# Patient Record
Sex: FEMALE | Race: WHITE
[De-identification: names, ages, dates, MRNs, and addresses within clinical notes are randomized per-mention and may not be internally consistent; named-entity substitution may affect disease eponyms.]

---

## 2017-05-08 ENCOUNTER — HOSPITAL ENCOUNTER (EMERGENCY)
Dept: HOSPITAL 62 - ER | Age: 47
Discharge: HOME | End: 2017-05-08
Payer: COMMERCIAL

## 2017-05-08 VITALS — SYSTOLIC BLOOD PRESSURE: 111 MMHG | DIASTOLIC BLOOD PRESSURE: 71 MMHG

## 2017-05-08 DIAGNOSIS — E11.65: ICD-10-CM

## 2017-05-08 DIAGNOSIS — F41.9: ICD-10-CM

## 2017-05-08 DIAGNOSIS — R07.89: ICD-10-CM

## 2017-05-08 DIAGNOSIS — I10: Primary | ICD-10-CM

## 2017-05-08 DIAGNOSIS — R00.0: ICD-10-CM

## 2017-05-08 LAB
ALBUMIN SERPL-MCNC: 4.5 G/DL (ref 3.5–5)
ALP SERPL-CCNC: 122 U/L (ref 38–126)
ALT SERPL-CCNC: 25 U/L (ref 9–52)
ANION GAP SERPL CALC-SCNC: 12 MMOL/L (ref 5–19)
AST SERPL-CCNC: 21 U/L (ref 14–36)
BASOPHILS # BLD AUTO: 0.1 10^3/UL (ref 0–0.2)
BASOPHILS NFR BLD AUTO: 0.6 % (ref 0–2)
BILIRUB DIRECT SERPL-MCNC: 0.5 MG/DL (ref 0–0.4)
BILIRUB SERPL-MCNC: 0.7 MG/DL (ref 0.2–1.3)
BUN SERPL-MCNC: 13 MG/DL (ref 7–20)
CALCIUM: 10.6 MG/DL (ref 8.4–10.2)
CHLORIDE SERPL-SCNC: 101 MMOL/L (ref 98–107)
CK MB SERPL-MCNC: 0.29 NG/ML (ref ?–4.55)
CK SERPL-CCNC: 31 U/L (ref 30–135)
CO2 SERPL-SCNC: 24 MMOL/L (ref 22–30)
CREAT SERPL-MCNC: 0.5 MG/DL (ref 0.52–1.25)
EOSINOPHIL # BLD AUTO: 0 10^3/UL (ref 0–0.6)
EOSINOPHIL NFR BLD AUTO: 0.2 % (ref 0–6)
ERYTHROCYTE [DISTWIDTH] IN BLOOD BY AUTOMATED COUNT: 13 % (ref 11.5–14)
GLUCOSE SERPL-MCNC: 354 MG/DL (ref 75–110)
HCT VFR BLD CALC: 46.1 % (ref 36–47)
HGB BLD-MCNC: 16 G/DL (ref 12–15.5)
HGB HCT DIFFERENCE: 1.9
LYMPHOCYTES # BLD AUTO: 1.4 10^3/UL (ref 0.5–4.7)
LYMPHOCYTES NFR BLD AUTO: 14.1 % (ref 13–45)
MCH RBC QN AUTO: 31.7 PG (ref 27–33.4)
MCHC RBC AUTO-ENTMCNC: 34.6 G/DL (ref 32–36)
MCV RBC AUTO: 92 FL (ref 80–97)
MONOCYTES # BLD AUTO: 0.4 10^3/UL (ref 0.1–1.4)
MONOCYTES NFR BLD AUTO: 3.7 % (ref 3–13)
NEUTROPHILS # BLD AUTO: 8 10^3/UL (ref 1.7–8.2)
NEUTS SEG NFR BLD AUTO: 81.4 % (ref 42–78)
POTASSIUM SERPL-SCNC: 5 MMOL/L (ref 3.6–5)
PROT SERPL-MCNC: 7.3 G/DL (ref 6.3–8.2)
RBC # BLD AUTO: 5.03 10^6/UL (ref 3.72–5.28)
SODIUM SERPL-SCNC: 136.6 MMOL/L (ref 137–145)
TROPONIN I SERPL-MCNC: < 0.012 NG/ML
WBC # BLD AUTO: 9.8 10^3/UL (ref 4–10.5)

## 2017-05-08 PROCEDURE — 85379 FIBRIN DEGRADATION QUANT: CPT

## 2017-05-08 PROCEDURE — 71010: CPT

## 2017-05-08 PROCEDURE — 82553 CREATINE MB FRACTION: CPT

## 2017-05-08 PROCEDURE — 84443 ASSAY THYROID STIM HORMONE: CPT

## 2017-05-08 PROCEDURE — 82550 ASSAY OF CK (CPK): CPT

## 2017-05-08 PROCEDURE — 36415 COLL VENOUS BLD VENIPUNCTURE: CPT

## 2017-05-08 PROCEDURE — 99284 EMERGENCY DEPT VISIT MOD MDM: CPT

## 2017-05-08 PROCEDURE — 85025 COMPLETE CBC W/AUTO DIFF WBC: CPT

## 2017-05-08 PROCEDURE — 84484 ASSAY OF TROPONIN QUANT: CPT

## 2017-05-08 PROCEDURE — 80053 COMPREHEN METABOLIC PANEL: CPT

## 2017-05-08 PROCEDURE — 93010 ELECTROCARDIOGRAM REPORT: CPT

## 2017-05-08 PROCEDURE — 93005 ELECTROCARDIOGRAM TRACING: CPT

## 2017-05-08 NOTE — EKG REPORT
SEVERITY:- BORDERLINE ECG -

SINUS TACHYCARDIA

PROBABLE LEFT ATRIAL ABNORMALITY

:

Confirmed by: Vu Duran 08-May-2017 08:36:55

## 2017-05-08 NOTE — ER DOCUMENT REPORT
ED General





- General


Chief Complaint: High Blood Pressure


Stated Complaint: CHEST DISCOMFORT


Mode of Arrival: Ambulatory


Information source: Patient


Notes: 


47 yr old female hx of htn presents with complaints of high blood pressure. pt 

notes she has not been on her medications for months, gets a chest pressure 

sensation when her bp is high. Pt admits ot her heart racing when she gets 

anxious and admits to alot of stressors in her life right now. denies any sob , 

dvt or pe risk factors. 


TRAVEL OUTSIDE OF THE [place] IN LAST 30 DAYS: No





- HPI


Onset: This morning


Onset/Duration: Sudden


Quality of pain: Pressure


Severity: Mild


Pain Level: 1


Associated symptoms: Chest pain


Exacerbated by: Denies


Relieved by: Denies


Similar symptoms previously: No


Recently seen / treated by doctor: No





- Related Data


Allergies/Adverse Reactions: 


 





No Known Allergies Allergy (Unverified 05/08/17 06:48)


 











Past Medical History





- Social History


Smoking Status: Never Smoker


Cigarette use (# per day): No


Chew tobacco use (# tins/day): No


Smoking Education Provided: No


Family History: Reviewed & Not Pertinent


Patient has suicidal ideation: No


Patient has homicidal ideation: No


Renal/ Medical History: Denies: Hx Peritoneal Dialysis





Review of Systems





- Review of Systems


Notes: 


REVIEW OF SYSTEMS:


CONSTITUTIONAL :  Denies fever,  chills, or sweats.  Denies recent illness.


EENT:   Denies eye, ear, throat, or mouth pain or symptoms.  Denies nasal or 

sinus congestion or discharge.  Denies throat, tongue, or mouth swelling or 

difficulty swallowing.


CARDIOVASCULAR: Admits to chest pressure


RESPIRATORY:  Denies cough, cold, or chest congestion.  Denies shortness of 

breath, difficulty breathing, or wheezing.


GASTROINTESTINAL:  Denies abdominal pain or distention.  Denies nausea, vomiting

, or diarrhea.  Denies blood in vomitus, stools, or per rectum.  Denies black, 

tarry stools.  Denies constipation. 


GENITOURINARY:  Denies difficulty urinating, painful urination, burning, 

frequency, blood in urine, or discharge.


FEMALE  GENITOURINARY:  Denies vaginal bleeding, heavy or abnormal periods, 

irregular periods.  Denies vaginal discharge or odor. 


MUSCULOSKELETAL:  Denies back or neck pain or stiffness.  Denies joint pain or 

swelling.


SKIN:   Denies rash, lesions or sores.


HEMATOLOGIC :   Denies easy bruising or bleeding.


LYMPHATIC:  Denies swollen, enlarged glands.


NEUROLOGICAL:  Denies confusion or altered mental status.  Denies passing out 

or loss of consciousness.  Denies dizziness or lightheadedness.  Denies 

headache.  Denies weakness or paralysis or loss of use of either side.  Denies 

problems with gait or speech.  Denies sensory loss, numbness, or tingling.  

Denies seizures.


PSYCHIATRIC:  anxiety stress





ALL OTHER SYSTEMS REVIEWED AND NEGATIVE.








Dictation was performed using Dragon voice recognition software 











PHYSICAL EXAMINATION:





GENERAL: Well-appearing, well-nourished and in no acute distress.





HEAD: Atraumatic, normocephalic.





EYES: Pupils equal round and reactive to light, extraocular movements intact, 

conjunctiva are normal.





ENT: Nares patent, oropharynx clear without exudates.  Moist mucous membranes.





NECK: Normal range of motion, supple without lymphadenopathy





LUNGS: Breath sounds clear to auscultation bilaterally and equal.  No wheezes 

rales or rhonchi.





HEART: Tachycardic





ABDOMEN: Soft, nontender, nondistended abdomen.  No guarding, no rebound.  No 

masses appreciated.





Female : deferred





Musculoskeletal: Normal range of motion, no pitting or edema.  No cyanosis.





NEUROLOGICAL: Cranial nerves grossly intact.  Normal speech, normal gait.  

Normal sensory, motor exams





PSYCH: Normal mood, normal affect.





SKIN: Warm, Dry, normal turgor, no rashes or lesions noted.





Physical Exam





- Vital signs


Vitals: 


 











Temp Pulse Resp BP Pulse Ox


 


 98.1 F   127 H  20   174/98 H  98 


 


 05/08/17 06:44  05/08/17 06:44  05/08/17 06:44  05/08/17 06:44  05/08/17 06:44














Course





- Re-evaluation


Re-evalutation: 


05/08/17 07:40


pt in no distress, but noted ot be tachy, will draw blood work, evaluate and 

treat her bp 





05/08/17 09:40


Patient's heart rate when I'm not in the room is noted to be 103, when I walked 

in it went up to 125





TSH pending patient instructed regarding her diabetes and follow-up with 

primary care





05/08/17 09:48


TSH is normal patient is otherwise well-appearing I will discharge home with 

close follow-up








After performing a Medical Screening Examination, I estimate there is LOW risk 

for RUPTURED ESOPHAGUS, PNEUMOTHORAX, PULMONARY EMBOLISM, ACUTE CORONARY 

SYNDROME, OR THORACIC AORTIC DISSECTION, thus I consider the discharge 

disposition reasonable.  I have reevaluated this patient multiple times and no 

significant life threatening changes are noted. The patient and I have 

discussed the diagnosis and risks, and we agree with discharging home with 

close follow-up. We also discussed returning to the Emergency Department 

immediately if new or worsening symptoms occur. We have discussed the symptoms 

which are most concerning (e.g., bloody sputum, worsening pain or shortness of 

breath) that necessitate immediate return.





- Vital Signs


Vital signs: 


 











Temp Pulse Resp BP Pulse Ox


 


 98.1 F   127 H  15   130/85 H  94 


 


 05/08/17 06:44  05/08/17 06:44  05/08/17 09:00  05/08/17 09:00  05/08/17 09:01














- Laboratory


Result Diagrams: 


 05/08/17 07:30





 05/08/17 08:25


Laboratory results interpreted by me: 


 











  05/08/17 05/08/17





  07:30 08:25


 


Hgb  16.0 H 


 


Seg Neutrophils %  81.4 H 


 


Sodium   136.6 L


 


Creatinine   0.50 L


 


Glucose   354 H


 


Calcium   10.6 H


 


Direct Bilirubin   0.5 H














- Diagnostic Test


Radiology reviewed: Image reviewed, Reports reviewed





- EKG Interpretation by Me


EKG shows normal: Sinus rhythm, Axis, Intervals, QRS Complexes


Rate: Tachycardia





Discharge





- Discharge


Clinical Impression: 


 Anxiety, hyperglycemia





Hypertension


Qualifiers:


 Hypertension type: essential hypertension Qualified Code(s): I10 - Essential (

primary) hypertension





Condition: Stable


Disposition: HOME, SELF-CARE


Instructions:  High Blood Pressure ([facility])


Additional Instructions: 


You must follow-up with your primary care physician regarding your high blood 

pressure as well as your high blood sugar.  Return immediately if there are any 

other concerns


Prescriptions: 


Lisinopril 10 mg PO DAILY #14 tablet

## 2020-03-31 ENCOUNTER — HOSPITAL ENCOUNTER (OUTPATIENT)
Dept: HOSPITAL 62 - OD | Age: 50
End: 2020-03-31
Attending: INTERNAL MEDICINE
Payer: COMMERCIAL

## 2020-03-31 DIAGNOSIS — Z00.00: ICD-10-CM

## 2020-03-31 DIAGNOSIS — E11.9: Primary | ICD-10-CM

## 2020-03-31 DIAGNOSIS — I10: ICD-10-CM

## 2020-03-31 LAB
ADD MANUAL DIFF: NO
ALBUMIN SERPL-MCNC: 4.7 G/DL (ref 3.5–5)
ALP SERPL-CCNC: 119 U/L (ref 38–126)
ANION GAP SERPL CALC-SCNC: 12 MMOL/L (ref 5–19)
AST SERPL-CCNC: 20 U/L (ref 14–36)
BASOPHILS # BLD AUTO: 0.1 10^3/UL (ref 0–0.2)
BASOPHILS NFR BLD AUTO: 0.9 % (ref 0–2)
BILIRUB DIRECT SERPL-MCNC: 0.1 MG/DL (ref 0–0.4)
BILIRUB SERPL-MCNC: 0.7 MG/DL (ref 0.2–1.3)
BUN SERPL-MCNC: 12 MG/DL (ref 7–20)
CALCIUM: 9.9 MG/DL (ref 8.4–10.2)
CHLORIDE SERPL-SCNC: 97 MMOL/L (ref 98–107)
CHOLEST SERPL-MCNC: 244.11 MG/DL (ref 0–200)
CO2 SERPL-SCNC: 25 MMOL/L (ref 22–30)
EOSINOPHIL # BLD AUTO: 0.1 10^3/UL (ref 0–0.6)
EOSINOPHIL NFR BLD AUTO: 1.3 % (ref 0–6)
ERYTHROCYTE [DISTWIDTH] IN BLOOD BY AUTOMATED COUNT: 12.8 % (ref 11.5–14)
GLUCOSE SERPL-MCNC: 281 MG/DL (ref 75–110)
HCT VFR BLD CALC: 45 % (ref 36–47)
HGB BLD-MCNC: 15.8 G/DL (ref 12–15.5)
LDLC SERPL DIRECT ASSAY-MCNC: 159 MG/DL (ref ?–100)
LYMPHOCYTES # BLD AUTO: 1.9 10^3/UL (ref 0.5–4.7)
LYMPHOCYTES NFR BLD AUTO: 30.4 % (ref 13–45)
MCH RBC QN AUTO: 31.5 PG (ref 27–33.4)
MCHC RBC AUTO-ENTMCNC: 35.2 G/DL (ref 32–36)
MCV RBC AUTO: 90 FL (ref 80–97)
MONOCYTES # BLD AUTO: 0.4 10^3/UL (ref 0.1–1.4)
MONOCYTES NFR BLD AUTO: 6.4 % (ref 3–13)
NEUTROPHILS # BLD AUTO: 3.9 10^3/UL (ref 1.7–8.2)
NEUTS SEG NFR BLD AUTO: 61 % (ref 42–78)
PLATELET # BLD: 231 10^3/UL (ref 150–450)
POTASSIUM SERPL-SCNC: 4.6 MMOL/L (ref 3.6–5)
PROT SERPL-MCNC: 7.5 G/DL (ref 6.3–8.2)
RBC # BLD AUTO: 5.02 10^6/UL (ref 3.72–5.28)
TOTAL CELLS COUNTED % (AUTO): 100 %
TRIGL SERPL-MCNC: 107 MG/DL (ref ?–150)
VLDLC SERPL CALC-MCNC: 21 MG/DL (ref 10–31)
WBC # BLD AUTO: 6.4 10^3/UL (ref 4–10.5)

## 2020-03-31 PROCEDURE — 80053 COMPREHEN METABOLIC PANEL: CPT

## 2020-03-31 PROCEDURE — 84443 ASSAY THYROID STIM HORMONE: CPT

## 2020-03-31 PROCEDURE — 83525 ASSAY OF INSULIN: CPT

## 2020-03-31 PROCEDURE — 80061 LIPID PANEL: CPT

## 2020-03-31 PROCEDURE — 83036 HEMOGLOBIN GLYCOSYLATED A1C: CPT

## 2020-03-31 PROCEDURE — 36415 COLL VENOUS BLD VENIPUNCTURE: CPT

## 2020-03-31 PROCEDURE — 85025 COMPLETE CBC W/AUTO DIFF WBC: CPT

## 2020-03-31 PROCEDURE — 84681 ASSAY OF C-PEPTIDE: CPT

## 2020-03-31 PROCEDURE — 82043 UR ALBUMIN QUANTITATIVE: CPT

## 2020-03-31 PROCEDURE — 82570 ASSAY OF URINE CREATININE: CPT

## 2020-04-01 LAB
CREAT UR-MCNC: 40.6 MG/DL
MICROALBUMIN UR-MCNC: 6.3 UG/ML

## 2021-01-14 ENCOUNTER — HOSPITAL ENCOUNTER (EMERGENCY)
Dept: HOSPITAL 62 - ER | Age: 51
Discharge: HOME | End: 2021-01-14
Payer: COMMERCIAL

## 2021-01-14 VITALS — SYSTOLIC BLOOD PRESSURE: 136 MMHG | DIASTOLIC BLOOD PRESSURE: 76 MMHG

## 2021-01-14 DIAGNOSIS — I10: ICD-10-CM

## 2021-01-14 DIAGNOSIS — Z76.0: Primary | ICD-10-CM

## 2021-01-14 DIAGNOSIS — E11.65: ICD-10-CM

## 2021-01-14 DIAGNOSIS — R11.2: ICD-10-CM

## 2021-01-14 DIAGNOSIS — Z79.4: ICD-10-CM

## 2021-01-14 DIAGNOSIS — R19.7: ICD-10-CM

## 2021-01-14 DIAGNOSIS — R10.9: ICD-10-CM

## 2021-01-14 LAB
ABSOLUTE LYMPHOCYTES# (MANUAL): 0.9 10^3/UL (ref 0.5–4.7)
ABSOLUTE MONOCYTES # (MANUAL): 0.1 10^3/UL (ref 0.1–1.4)
ADD MANUAL DIFF: YES
ALBUMIN SERPL-MCNC: 3.7 G/DL (ref 3.5–5)
ALP SERPL-CCNC: 109 U/L (ref 38–126)
ANION GAP SERPL CALC-SCNC: 10 MMOL/L (ref 5–19)
APPEARANCE UR: CLEAR
APTT PPP: (no result) S
AST SERPL-CCNC: 20 U/L (ref 14–36)
BASOPHILS NFR BLD MANUAL: 0 % (ref 0–2)
BILIRUB DIRECT SERPL-MCNC: 0.2 MG/DL (ref 0–0.4)
BILIRUB SERPL-MCNC: 0.7 MG/DL (ref 0.2–1.3)
BILIRUB UR QL STRIP: NEGATIVE
BUN SERPL-MCNC: 11 MG/DL (ref 7–20)
CALCIUM: 8.3 MG/DL (ref 8.4–10.2)
CHLORIDE SERPL-SCNC: 106 MMOL/L (ref 98–107)
CK SERPL-CCNC: < 20 U/L (ref 30–135)
CO2 SERPL-SCNC: 21 MMOL/L (ref 22–30)
CRP SERPL-MCNC: 19.6 MG/L (ref ?–10)
EOSINOPHIL NFR BLD MANUAL: 1 % (ref 0–6)
ERYTHROCYTE [DISTWIDTH] IN BLOOD BY AUTOMATED COUNT: 13.3 % (ref 11.5–14)
GLUCOSE SERPL-MCNC: 368 MG/DL (ref 75–110)
GLUCOSE UR STRIP-MCNC: >=500 MG/DL
HCT VFR BLD CALC: 44.8 % (ref 36–47)
HGB BLD-MCNC: 15.7 G/DL (ref 12–15.5)
KETONES UR STRIP-MCNC: 80 MG/DL
MCH RBC QN AUTO: 30.9 PG (ref 27–33.4)
MCHC RBC AUTO-ENTMCNC: 35.1 G/DL (ref 32–36)
MCV RBC AUTO: 88 FL (ref 80–97)
MONOCYTES % (MANUAL): 1 % (ref 3–13)
NEUTS BAND NFR BLD MANUAL: 3 % (ref 3–5)
NITRITE UR QL STRIP: NEGATIVE
PH UR STRIP: 5 [PH] (ref 5–9)
PLATELET # BLD: 213 10^3/UL (ref 150–450)
PLATELET CLUMP BLD QL SMEAR: PRESENT
PLATELET COMMENT: ADEQUATE
POLYCHROMASIA BLD QL SMEAR: SLIGHT
POTASSIUM SERPL-SCNC: 4.4 MMOL/L (ref 3.6–5)
PROT SERPL-MCNC: 6.2 G/DL (ref 6.3–8.2)
PROT UR STRIP-MCNC: NEGATIVE MG/DL
RBC # BLD AUTO: 5.08 10^6/UL (ref 3.72–5.28)
SEGMENTED NEUTROPHILS % (MAN): 85 % (ref 42–78)
SP GR UR STRIP: 1.04
TOTAL CELLS COUNTED BLD: 100
UROBILINOGEN UR-MCNC: NEGATIVE MG/DL (ref ?–2)
VARIANT LYMPHS NFR BLD MANUAL: 7 % (ref 13–45)
WBC # BLD AUTO: 9.4 10^3/UL (ref 4–10.5)

## 2021-01-14 PROCEDURE — 80053 COMPREHEN METABOLIC PANEL: CPT

## 2021-01-14 PROCEDURE — 96375 TX/PRO/DX INJ NEW DRUG ADDON: CPT

## 2021-01-14 PROCEDURE — 96374 THER/PROPH/DIAG INJ IV PUSH: CPT

## 2021-01-14 PROCEDURE — 83036 HEMOGLOBIN GLYCOSYLATED A1C: CPT

## 2021-01-14 PROCEDURE — 82550 ASSAY OF CK (CPK): CPT

## 2021-01-14 PROCEDURE — 85025 COMPLETE CBC W/AUTO DIFF WBC: CPT

## 2021-01-14 PROCEDURE — 85379 FIBRIN DEGRADATION QUANT: CPT

## 2021-01-14 PROCEDURE — 36415 COLL VENOUS BLD VENIPUNCTURE: CPT

## 2021-01-14 PROCEDURE — 99284 EMERGENCY DEPT VISIT MOD MDM: CPT

## 2021-01-14 PROCEDURE — 81001 URINALYSIS AUTO W/SCOPE: CPT

## 2021-01-14 PROCEDURE — 96361 HYDRATE IV INFUSION ADD-ON: CPT

## 2021-01-14 PROCEDURE — 86140 C-REACTIVE PROTEIN: CPT

## 2021-01-14 NOTE — ER DOCUMENT REPORT
Entered by MONICA MATT SCRIBE  01/14/21 0838 





Acting as scribe for:LJ FELDER MD





ED GI/





- General


Chief Complaint: Nausea/Vomiting/Diarrhea


Stated Complaint: NAUSEA/VOMITING/DIARRHEA


Time Seen by Provider: 01/14/21 08:29


Primary Care Provider: 


VASQUEZ HEALY MD [Primary Care Provider] - Follow up in 1 week


Mode of Arrival: Ambulatory


Information source: Patient


Notes: 





This 50-year-old female patient presents to the emergency department today with 

nausea, vomiting, and diarrhea which all began abruptly this morning around 2:30

AM.  When she went to bed last night she felt normal.  She has vomited x3 times 

when she describes her diarrhea as watery with a foul odor.  She denies any 

known sick contacts.  She has not been around anyone with COVID-19 to her 

knowledge.





TRAVEL OUTSIDE OF THE [place] IN LAST 30 DAYS: No





- Related Data


Allergies/Adverse Reactions: 


                                        





No Known Allergies Allergy (Verified 01/14/21 08:12)


   











Past Medical History





- General


Information source: Patient





- Social History


Smoking Status: Never Smoker


Cigarette use (# per day): No


Frequency of alcohol use: None


Drug Abuse: None


Occupation: [employer]


Family History: Reviewed & Not Pertinent





- Past Medical History


Cardiac Medical History: Reports: Hx Hypertension


Endocrine Medical History: Reports: Hx Diabetes Mellitus Type 2


Past Surgical History: Reports: Hx Appendectomy, Other - Right optic nerve 

dermoid cyst removal





Review of Systems





- Review of Systems


Constitutional: No symptoms reported


EENT: No symptoms reported


Cardiovascular: No symptoms reported


Respiratory: No symptoms reported


Gastrointestinal: See HPI, Abdominal pain, Nausea, Vomiting


Genitourinary: No symptoms reported


Female Genitourinary: No symptoms reported


Musculoskeletal: No symptoms reported


Skin: No symptoms reported


Hematologic/Lymphatic: No symptoms reported


Neurological/Psychological: No symptoms reported


-: Yes All other systems reviewed and negative





Physical Exam





- Vital signs


Vitals: 


                                        











Temp Pulse Resp BP Pulse Ox


 


 98.0 F   114 H  18   150/87 H  98 


 


 01/14/21 07:23  01/14/21 07:23  01/14/21 07:23  01/14/21 07:23  01/14/21 07:23














- Notes


Notes: 





Physical Exam:


 


General: Alert, appears well. 


 


HEENT: Normocephalic. Atraumatic. PERRL. Extraocular movements intact. 

Oropharynx clear.


 


Neck: Supple. Non-tender.


 


Respiratory: No respiratory distress. Clear and equal breath sounds bilaterally.


 


Cardiovascular: Mildly tachycardic, regular rhythm.


 


Abdominal: Non-tender. No distension.  Hyperactive bowel Sounds. 


 


Back: No gross abnormalities. 


 


Extremities: Moves all four extremities.


Upper extremities: Normal inspection. Normal ROM.  


Lower extremities: Normal inspection. No edema. Normal ROM.


 


Neurological: Normal cognition. AAOx4. Normal speech.  


 


Psychological: Normal affect. Normal Mood. 


 


Skin: Warm. Dry. Normal color.





Course





- Re-evaluation


Re-evalutation: 





01/14/21 12:15


The patient states that the Bydureon she had been prescribed she could not 

afford because she did not have insurance.  She was given samples in the office,

but was unable to  a prescription from April of last year.  She has been 

out of medication since then.  She states she had lost her insurance.  Reviewing

records today shows the patient has [place] [employer] policy issued on 

09/17/2020 that includes prescription drug benefits.  She does not know what is 

prescription drug benefits are, because they have not tried to use the insurance

card since they got it and has not been back to see her primary care provider.





I discussed the case with the hospitalist and the best course of action based on

self-pay was to put her on NovoLog 70/30 12 units twice daily.  She states she 

has plenty of syringes needles, test strips and a glucometer at home.


01/14/21 12:24


I was unable to actually type a prescription for NovoLog 70/30 into the required

format, the database did not include 7030 in any form other than the more 

expensive flex pen, so I hand wrote a prescription for NovoLog 70/30, 12 units 

twice daily before breakfast and before dinner, 10 mL.





- Vital Signs


Vital signs: 


                                        











Temp Pulse Resp BP Pulse Ox


 


 98.8 F   104 H  10 L  133/72 H  100 


 


 01/14/21 10:56  01/14/21 10:56  01/14/21 10:56  01/14/21 10:56  01/14/21 10:56














- Laboratory Results


Result Diagrams: 


                                 01/14/21 09:00





                                 01/14/21 10:19


Laboratory Results Interpreted: 


                                        











  01/14/21 01/14/21 01/14/21





  08:30 09:00 09:00


 


Hgb   15.7 H 


 


Seg Neuts % (Manual)   85 H 


 


Lymphocytes % (Manual)   7 L 


 


Monocytes % (Manual)   1 L 


 


Abs Neuts (Manual)   8.3 H 


 


Carbon Dioxide   


 


Creatinine   


 


Glucose   


 


Hemoglobin A1c %    12.7 H


 


Calcium   


 


Creatine Kinase   


 


C-Reactive Protein   


 


Total Protein   


 


Urine Glucose (UA)  >=500 H  


 


Urine Ketones  80 H  














  01/14/21





  10:19


 


Hgb 


 


Seg Neuts % (Manual) 


 


Lymphocytes % (Manual) 


 


Monocytes % (Manual) 


 


Abs Neuts (Manual) 


 


Carbon Dioxide  21 L


 


Creatinine  0.40 L


 


Glucose  368 H


 


Hemoglobin A1c % 


 


Calcium  8.3 L


 


Creatine Kinase  < 20 L


 


C-Reactive Protein  19.6 H


 


Total Protein  6.2 L


 


Urine Glucose (UA) 


 


Urine Ketones 











Critical Laboratory Results Reviewed: Yes


Attending or Supervising Physician who Reviewed Labs: LJ FELDER - Elevated 

blood sugar and hemoglobin A1c





- Radiology Results


Critical Radiology Results Reviewed: No Critical Results





Discharge





- Discharge


Clinical Impression: 


 Nausea, vomiting and diarrhea, Has run out of medications





Hyperglycemia due to type 2 diabetes mellitus


Qualifiers:


 Diabetes mellitus long term insulin use: unspecified long term insulin use 

status Qualified Code(s): E11.65 - Type 2 diabetes mellitus with hyperglycemia





Condition: Stable


Disposition: HOME, SELF-CARE


Additional Instructions: 


Gastroenteritis





     You most likely have gastroenteritis.  This is an irritation of the stomach

and intestinal tract.  It's usually caused by a virus, but can also be caused by

bacteria, toxins that cause food poisoning, or excessive alcohol intake.  

Symptoms may include fever, painful abdominal cramps, nausea, vomiting, and 

diarrhea.


     Start with small amounts (two to six ounces) of clear liquids (soft drinks,

herb teas, broth, etc).  Try to take fluids frequently even if you are vomiting,

to prevent dehydration.  When liquids are being consumed successfully, advance 

to small amounts of bland food (mashed potato, toast) for 6 - 12 hours.


     Gastroenteritis rarely requires medication. It goes away by itself. Use 

good handwashing so you don't spread germs. Wash underwear in very hot water.


     If symptoms are severe, talk to the doctor. Call your physician if blood 

appears in your vomitus or stool, if vomiting lasts longer than 24 hours, if the

abdominal pain worsens or becomes localized to one area, or if you develop high 

fever.





Hyperglycemia (High Blood Sugar)





     You have an abnormally high blood sugar. Not all high blood sugar requires 

long-term treatment.  High blood sugar can be due to medications, pregnancy, or 

the stress of illness.  (These cases are "borderline diabetes.")  If the doctor 

feels your high blood sugar might resolve with time, you may not require treat

ment now.


     You will be scheduled for further evaluation.  It's very important that you

follow through, to see if the blood sugar returns to normal levels.  

Uncontrolled high blood sugar leads to early heart disease, strokes, nerve 

damage, eye damage, and kidney damage.


     Call the physician if there is faintness, excess sleepiness, or very rapid 

breathing.





**********************************

********************************************************************************


*****





You were given a prescription for NovoLog 70/30.  Take 12 units twice daily, 

before breakfast and before dinner.  Check your blood sugars before you give 

yourself the insulin.





Check into your prescription drug benefits to see what your medication coverage 

is.


Drink plenty of fluids and rest today.





Follow-up with your primary care provider in the next 2 weeks, to see how your 

blood sugars are doing, and to see what adjustments to your medications may be 

needed.








RETURN TO THE EMERGENCY ROOM IF ANY NEW OR WORSENING SYMPTOMS.








Prescriptions: 


Ondansetron [Zofran Odt 4 mg Tablet] 1 - 2 tab PO Q4H PRN #14 tab.rapdis


 PRN Reason: 


Forms:  Special Work Note


Referrals: 


VASQUEZ HEALY MD [Primary Care Provider] - Follow up in 1 week





I personally performed the services described in the documentation, reviewed and

edited the documentation which was dictated to the scribe in my presence, and it

accurately records my words and actions.